# Patient Record
Sex: FEMALE | Race: WHITE | Employment: FULL TIME | URBAN - METROPOLITAN AREA
[De-identification: names, ages, dates, MRNs, and addresses within clinical notes are randomized per-mention and may not be internally consistent; named-entity substitution may affect disease eponyms.]

---

## 2021-02-01 ENCOUNTER — TRANSCRIBE ORDER (OUTPATIENT)
Dept: SCHEDULING | Age: 46
End: 2021-02-01

## 2021-02-01 DIAGNOSIS — Z12.31 SCREENING MAMMOGRAM FOR HIGH-RISK PATIENT: Primary | ICD-10-CM

## 2021-03-05 ENCOUNTER — HOSPITAL ENCOUNTER (OUTPATIENT)
Dept: MAMMOGRAPHY | Age: 46
Discharge: HOME | End: 2021-03-05
Attending: NURSE PRACTITIONER

## 2021-03-05 DIAGNOSIS — Z12.31 SCREENING MAMMOGRAM FOR HIGH-RISK PATIENT: ICD-10-CM

## 2021-03-12 NOTE — PROGRESS NOTES
pls notify pt radiology has rec additional imaging. They should reach out to schedule, if they do not pls let us know so we can order.

## 2021-03-18 ENCOUNTER — HOSPITAL ENCOUNTER (OUTPATIENT)
Dept: MAMMOGRAPHY | Age: 46
Discharge: HOME | End: 2021-03-18
Attending: NURSE PRACTITIONER
Payer: COMMERCIAL

## 2021-03-18 DIAGNOSIS — R92.8 ABNORMAL SCREENING MAMMOGRAM: ICD-10-CM

## 2021-03-18 PROCEDURE — 77065 DX MAMMO INCL CAD UNI: CPT

## 2022-06-21 ENCOUNTER — OFFICE VISIT (OUTPATIENT)
Dept: OBGYN CLINIC | Age: 47
End: 2022-06-21
Payer: COMMERCIAL

## 2022-06-21 VITALS
HEIGHT: 61 IN | WEIGHT: 162 LBS | SYSTOLIC BLOOD PRESSURE: 116 MMHG | DIASTOLIC BLOOD PRESSURE: 72 MMHG | BODY MASS INDEX: 30.58 KG/M2

## 2022-06-21 DIAGNOSIS — Z01.818 TUBAL LIGATION EVALUATION: Primary | ICD-10-CM

## 2022-06-21 PROCEDURE — 99213 OFFICE O/P EST LOW 20 MIN: CPT | Performed by: NURSE PRACTITIONER

## 2022-06-21 NOTE — PROGRESS NOTES
The patient is a 41 y.o. No obstetric history on file. who is seen stopped taking BC mid April and pt has noticed an improvement in her mood. She is not interested in continuing use of birth control. She is interested in getting a referral to get her tubes tied. Patient is considering having Mirena placed, but would like to discuss having a BTL before making her decision. Last pap smear: 1/29/2021 Negative, HPV negative     HISTORY:    No obstetric history on file. Patient's last menstrual period was 06/05/2022 (exact date). Sexual History:  has sex with males  Contraception:  none  Current Outpatient Medications on File Prior to Visit   Medication Sig Dispense Refill    acyclovir (ZOVIRAX) 400 MG tablet Take 400 mg by mouth 2 times daily as needed      norethindrone-ethinyl estradiol (JUNEL FE 1/20) 1-20 MG-MCG per tablet Take 1 tablet by mouth daily (Patient not taking: Reported on 6/21/2022)       No current facility-administered medications on file prior to visit. ROS:  Feeling well. No dyspnea or chest pain on exertion. No abdominal pain, change in bowel habits, black or bloody stools. No urinary tract symptoms. GYN ROS: normal menses, no abnormal bleeding, pelvic pain or discharge. PHYSICAL EXAM:  Blood pressure 116/72, height 5' 1\" (1.549 m), weight 162 lb (73.5 kg), last menstrual period 06/05/2022, not currently breastfeeding. The patient appears well, alert, oriented x 3, in no distress. Lungs are clear. Heart RRR, no murmurs. Pelvic:  examination not indicated. ASSESSMENT:  Encounter Diagnosis   Name Primary?  Tubal ligation evaluation Yes       PLAN:    Patient considering BTL vs Mirena. Information provided about Mirena IUD. Referral to [facility] for talk with surgeon about BTL. Will call office if she decides to move forward with Mirena.      Orders Placed This Encounter   Procedures    External Referral to Ob/Gyn     Referral Priority:   Routine     Referral Type: Eval and Treat     Referral Reason:   Specialty Services Required     Requested Specialty:   Obstetrics & Gynecology     Number of Visits Requested:   1         Supervising physician is Dr. Gerhardt Dose  Greater than 50% of the 20 minute visit were spent in counseling to the above topics.